# Patient Record
Sex: MALE | Race: WHITE | Employment: FULL TIME | URBAN - METROPOLITAN AREA
[De-identification: names, ages, dates, MRNs, and addresses within clinical notes are randomized per-mention and may not be internally consistent; named-entity substitution may affect disease eponyms.]

---

## 2017-12-07 ENCOUNTER — OFFICE VISIT (OUTPATIENT)
Dept: FAMILY MEDICINE CLINIC | Age: 32
End: 2017-12-07

## 2017-12-07 VITALS
DIASTOLIC BLOOD PRESSURE: 62 MMHG | BODY MASS INDEX: 26.71 KG/M2 | HEART RATE: 70 BPM | SYSTOLIC BLOOD PRESSURE: 98 MMHG | RESPIRATION RATE: 17 BRPM | HEIGHT: 71 IN | WEIGHT: 190.8 LBS | OXYGEN SATURATION: 99 %

## 2017-12-07 DIAGNOSIS — Z23 NEED FOR TDAP VACCINATION: ICD-10-CM

## 2017-12-07 DIAGNOSIS — F11.10 HEROIN ABUSE (HCC): ICD-10-CM

## 2017-12-07 DIAGNOSIS — Z00.00 ANNUAL PHYSICAL EXAM: Primary | ICD-10-CM

## 2017-12-07 DIAGNOSIS — F90.9 ATTENTION DEFICIT HYPERACTIVITY DISORDER (ADHD), UNSPECIFIED ADHD TYPE: ICD-10-CM

## 2017-12-07 PROCEDURE — 90715 TDAP VACCINE 7 YRS/> IM: CPT | Performed by: FAMILY MEDICINE

## 2017-12-07 PROCEDURE — 99385 PREV VISIT NEW AGE 18-39: CPT | Performed by: FAMILY MEDICINE

## 2017-12-07 PROCEDURE — 90471 IMMUNIZATION ADMIN: CPT | Performed by: FAMILY MEDICINE

## 2017-12-07 ASSESSMENT — PATIENT HEALTH QUESTIONNAIRE - PHQ9
SUM OF ALL RESPONSES TO PHQ QUESTIONS 1-9: 0
SUM OF ALL RESPONSES TO PHQ9 QUESTIONS 1 & 2: 0
2. FEELING DOWN, DEPRESSED OR HOPELESS: 0
1. LITTLE INTEREST OR PLEASURE IN DOING THINGS: 0

## 2017-12-07 NOTE — PROGRESS NOTES
The patient was offered the influenza vaccination. The benefits of this immunization were discussed, but he has declined receiving it today, because he believes that he had this 2 weeks ago.

## 2017-12-07 NOTE — PATIENT INSTRUCTIONS
Patient Education        Well Visit, Ages 25 to 48: Care Instructions  Your Care Instructions    Physical exams can help you stay healthy. Your doctor has checked your overall health and may have suggested ways to take good care of yourself. He or she also may have recommended tests. At home, you can help prevent illness with healthy eating, regular exercise, and other steps. Follow-up care is a key part of your treatment and safety. Be sure to make and go to all appointments, and call your doctor if you are having problems. It's also a good idea to know your test results and keep a list of the medicines you take. How can you care for yourself at home? · Reach and stay at a healthy weight. This will lower your risk for many problems, such as obesity, diabetes, heart disease, and high blood pressure. · Get at least 30 minutes of physical activity on most days of the week. Walking is a good choice. You also may want to do other activities, such as running, swimming, cycling, or playing tennis or team sports. Discuss any changes in your exercise program with your doctor. · Do not smoke or allow others to smoke around you. If you need help quitting, talk to your doctor about stop-smoking programs and medicines. These can increase your chances of quitting for good. · Talk to your doctor about whether you have any risk factors for sexually transmitted infections (STIs). Having one sex partner (who does not have STIs and does not have sex with anyone else) is a good way to avoid these infections. · Use birth control if you do not want to have children at this time. Talk with your doctor about the choices available and what might be best for you. · Protect your skin from too much sun. When you're outdoors from 10 a.m. to 4 p.m., stay in the shade or cover up with clothing and a hat with a wide brim. Wear sunglasses that block UV rays.  Even when it's cloudy, put broad-spectrum sunscreen (SPF 30 or higher) on any exposed skin. · See a dentist one or two times a year for checkups and to have your teeth cleaned. · Wear a seat belt in the car. · Drink alcohol in moderation, if at all. That means no more than 2 drinks a day for men and 1 drink a day for women. Follow your doctor's advice about when to have certain tests. These tests can spot problems early. For everyone  · Cholesterol. Have the fat (cholesterol) in your blood tested after age 21. Your doctor will tell you how often to have this done based on your age, family history, or other things that can increase your risk for heart disease. · Blood pressure. Have your blood pressure checked during a routine doctor visit. Your doctor will tell you how often to check your blood pressure based on your age, your blood pressure results, and other factors. · Vision. Talk with your doctor about how often to have a glaucoma test.  · Diabetes. Ask your doctor whether you should have tests for diabetes. · Colon cancer. Have a test for colon cancer at age 48. You may have one of several tests. If you are younger than 48, you may need a test earlier if you have any risk factors. Risk factors include whether you already had a precancerous polyp removed from your colon or whether your parent, brother, sister, or child has had colon cancer. For women  · Breast exam and mammogram. Talk to your doctor about when you should have a clinical breast exam and a mammogram. Medical experts differ on whether and how often women under 50 should have these tests. Your doctor can help you decide what is right for you. · Pap test and pelvic exam. Begin Pap tests at age 24. A Pap test is the best way to find cervical cancer. The test often is part of a pelvic exam. Ask how often to have this test.  · Tests for sexually transmitted infections (STIs). Ask whether you should have tests for STIs.  You may be at risk if you have sex with more than one person, especially if your partners do not wear condoms. For men  · Tests for sexually transmitted infections (STIs). Ask whether you should have tests for STIs. You may be at risk if you have sex with more than one person, especially if you do not wear a condom. · Testicular cancer exam. Ask your doctor whether you should check your testicles regularly. · Prostate exam. Talk to your doctor about whether you should have a blood test (called a PSA test) for prostate cancer. Experts differ on whether and when men should have this test. Some experts suggest it if you are older than 39 and are -American or have a father or brother who got prostate cancer when he was younger than 72. When should you call for help? Watch closely for changes in your health, and be sure to contact your doctor if you have any problems or symptoms that concern you. Where can you learn more? Go to [email or web address] and sign in to your [facility] account. Enter P072 in the [place] box to learn more about \"Well Visit, Ages 25 to 48: Care Instructions. \"     If you do not have an account, please click on the \"Sign Up Now\" link. Current as of: May 12, 2017  Content Version: 11.4  © 4141-2591 [employer]. Care instructions adapted under license by [facility] ([facility]). If you have questions about a medical condition or this instruction, always ask your healthcare professional. [address]  any warranty or liability for your use of this information.

## 2017-12-11 ENCOUNTER — TELEPHONE (OUTPATIENT)
Dept: FAMILY MEDICINE CLINIC | Age: 32
End: 2017-12-11

## 2017-12-11 NOTE — TELEPHONE ENCOUNTER
Patient is requesting a new patient appointment, Patient state was previously seen by Dr. Willie Nava 12/07/2017 and he recommended that he see Dr. Leslie Metzger. Please inform us if it would be ok to schedule  And when would you like for us to schedule the new patient appointment?

## 2017-12-11 NOTE — TELEPHONE ENCOUNTER
Anytime a PCP in our office suggests a pt see me, an appt can be made in any of my open New Patient appointment slots (not the \"on hold\" ones unless pt is seeing me the same day, right after seeing PCP). It does not need to be cleared with me first. Please call pt to offer him an appt with me.

## 2017-12-14 ENCOUNTER — OFFICE VISIT (OUTPATIENT)
Dept: PSYCHOLOGY | Age: 32
End: 2017-12-14

## 2017-12-14 DIAGNOSIS — F19.10 SUBSTANCE ABUSE, DAILY USE (HCC): ICD-10-CM

## 2017-12-14 DIAGNOSIS — F43.22 ADJUSTMENT DISORDER WITH ANXIETY: Primary | ICD-10-CM

## 2017-12-14 PROCEDURE — 90791 PSYCH DIAGNOSTIC EVALUATION: CPT | Performed by: PSYCHOLOGIST

## 2017-12-14 NOTE — PROGRESS NOTES
Behavioral Health Consultation  RAFAEL Avelar,Ph.D.  Psychologist  12/14/2017      Time spent with Patient: 30 minutes  This is patient's first  [facility] appointment. Reason for Consult:    Chief Complaint   Patient presents with    ADHD    Addiction Problem     Referring Provider: Starla Moya MD  [facility]  [place], [address]    Pt provided informed consent for the behavioral health program. Discussed with patient model of service to include the limits of confidentiality (i.e. abuse reporting, suicide intervention, etc.) and short-term intervention focused approach. Pt indicated understanding. Feedback given to PCP. S:  Pt is struggling with drugs. Wants to come up with a plan and improve quality of life for him and his family. Pt reports being compulsive as a kid. Has done two inpx rehabs. Some periods of sobriety. Tends to switch one substance for another. Was dx'd with ADHD and had Adderall, pt reports he fought with his wife and she called his MD and said he was abusing it so started buying drugs on the street. Currently addicted to heroin. Was on Suboxone in the past and he said he did fairly well on it. Has been sober for about 14-15 months for the longest time. Still  to same woman and she is hard on pt about the addiction. Pt admits to current use. Says he is honest with his wife. Has a 1year old son. Pt quit his job last week, has a new job lined up to start next week. Talks about how his wife gets mad when he's clean and calm, says she monitors his every move, accuses him of cheating on him, has a tracker on his car. Says she also called his PO and made up stories about him. Says his wife tells his parents stories about him to turn them against him and that she threatens to not let him see their son again.       O:  MSE:    Appearance    cooperative  Appetite normal  Sleep disturbance No  Fatigue No  Loss of pleasure No  Impulsive behavior Yes  Speech    normal rate  Mood    Anxious  Irritability  Affect    normal affect  Thought Content    intact  Thought Process    coherent  Associations    logical connections  Insight    Fair  Judgment    Intact  Orientation    oriented to person, place, time, and general circumstances  Memory    recent and remote memory intact  Attention/Concentration    impaired  Morbid ideation No  Suicide Assessment    no suicidal ideation      History:    Medications:   Current Outpatient Prescriptions   Medication Sig Dispense Refill    NONFORMULARY        No current facility-administered medications for this visit. Social History:   Social History     Social History    Marital status: Unknown     Spouse name: N/A    Number of children: N/A    Years of education: N/A     Occupational History    Not on file. Social History Main Topics    Smoking status: Current Every Day Smoker     Types: Cigarettes     Start date: 2005    Smokeless tobacco: Never Used    Alcohol use No    Drug use: Yes     Frequency: 25.0 times per week     Types: Other-see comments      Comment: heroin, on and off    Sexual activity: Yes     Partners: Female      Comment: wife     Other Topics Concern    Not on file     Social History Narrative    No narrative on file       TOBACCO:   reports that he has been smoking Cigarettes. He started smoking about 12 years ago. He has never used smokeless tobacco.  ETOH:   reports that he does not drink alcohol. Family History:   No family history on file. A:  Pt actively using heroin, says it consumes his life and is like a full-time job. Currently between jobs. Lots of conflict with wife who threatens to never let him see his son again. First priority is psychiatry to get back on Suboxone (and maybe something for ADHD). If [facility] his [facility], can also get therapy there. If not, will need to find a psychiatrist and more intensive, traditional outpatient therapy.   Can continue here as a

## 2022-04-05 ENCOUNTER — OFFICE VISIT (OUTPATIENT)
Dept: FAMILY MEDICINE CLINIC | Age: 37
End: 2022-04-05

## 2022-04-05 VITALS
HEIGHT: 72 IN | BODY MASS INDEX: 33.32 KG/M2 | HEART RATE: 78 BPM | WEIGHT: 246 LBS | OXYGEN SATURATION: 98 % | SYSTOLIC BLOOD PRESSURE: 114 MMHG | DIASTOLIC BLOOD PRESSURE: 78 MMHG

## 2022-04-05 DIAGNOSIS — F19.10 DRUG ABUSE (HCC): ICD-10-CM

## 2022-04-05 DIAGNOSIS — Z00.00 PHYSICAL EXAM, ANNUAL: Primary | ICD-10-CM

## 2022-04-05 DIAGNOSIS — B35.4 TINEA CORPORIS: ICD-10-CM

## 2022-04-05 DIAGNOSIS — M79.89 LEG SWELLING: ICD-10-CM

## 2022-04-05 DIAGNOSIS — L73.9 FOLLICULITIS: ICD-10-CM

## 2022-04-05 PROCEDURE — 99395 PREV VISIT EST AGE 18-39: CPT | Performed by: FAMILY MEDICINE

## 2022-04-05 RX ORDER — BACITRACIN, NEOMYCIN, POLYMYXIN B 400; 3.5; 5 [USP'U]/G; MG/G; [USP'U]/G
OINTMENT TOPICAL
Qty: 9 EACH | Refills: 0 | Status: SHIPPED | OUTPATIENT
Start: 2022-04-05 | End: 2022-04-15

## 2022-04-05 SDOH — ECONOMIC STABILITY: FOOD INSECURITY: WITHIN THE PAST 12 MONTHS, THE FOOD YOU BOUGHT JUST DIDN'T LAST AND YOU DIDN'T HAVE MONEY TO GET MORE.: NEVER TRUE

## 2022-04-05 SDOH — ECONOMIC STABILITY: FOOD INSECURITY: WITHIN THE PAST 12 MONTHS, YOU WORRIED THAT YOUR FOOD WOULD RUN OUT BEFORE YOU GOT MONEY TO BUY MORE.: NEVER TRUE

## 2022-04-05 ASSESSMENT — PATIENT HEALTH QUESTIONNAIRE - PHQ9
SUM OF ALL RESPONSES TO PHQ QUESTIONS 1-9: 0
2. FEELING DOWN, DEPRESSED OR HOPELESS: 0
SUM OF ALL RESPONSES TO PHQ QUESTIONS 1-9: 0
SUM OF ALL RESPONSES TO PHQ9 QUESTIONS 1 & 2: 0
1. LITTLE INTEREST OR PLEASURE IN DOING THINGS: 0

## 2022-04-05 ASSESSMENT — SOCIAL DETERMINANTS OF HEALTH (SDOH): HOW HARD IS IT FOR YOU TO PAY FOR THE VERY BASICS LIKE FOOD, HOUSING, MEDICAL CARE, AND HEATING?: NOT HARD AT ALL

## 2022-04-05 NOTE — PROGRESS NOTES
Chief Complaint: Establish Care ( swelling and sensitivity left breast, ring worm 6 months chest, draing groin area cysr ), Leg Swelling ( bilateral swelling months left leg worse), and Foot Injury (stepped on nail 4/1/2021)       HPI: He is here to establish care    He has a history of drug abuse currently  on drugs but does not want to talk about it. But on further questioning he agrees he has been snorting methylphenidate. He has history of ADHD and hence he has been using has no physician would prescribe him. However in the past record has a history of heroin abuse    He is here today because of bilateral leg swelling. Denies any shortness of breath or chest pain  But complains of having a rash on his chest and also having a open wound in his left thigh which is been oozing. He has history of anxiety  He does not want to see any counselor because he does not have insurance. Patient's problem list, medications, allergies, past medical, surgical, social and family histories were reviewed and updated as appropriate. Current Outpatient Medications   Medication Sig Dispense Refill    terbinafine (LAMISIL) 1 % cream Apply topically 2 times daily. 42 g 0    neomycin-bacitracin-polymyxin (NEOSPORIN) 400-5-5000 ointment Apply topically 2 times daily. 9 each 0    NONFORMULARY        No current facility-administered medications for this visit. Social History     Tobacco Use    Smoking status: Current Every Day Smoker     Types: Cigarettes     Start date: 2005    Smokeless tobacco: Never Used   Substance Use Topics    Alcohol use: No            Review of Systems:  Constitutional: Negative for appetite change, fatigue, fever and unexpected weight change. HENT: Negative  Respiratory: Negative for cough, chest tightness, shortness of breath and wheezing. Cardiovascular: Negative for chest pain, palpitations and leg swelling.    Gastrointestinal: Negative for abdominal pain, blood in stool, constipation, diarrhea, nausea and vomiting. Genitourinary: Negative for difficulty urinating, flank pain, frequency, hematuria and urgency. Musculoskeletal: Negative for gait problem, joint swelling and myalgias. Skin: As mentioned above  Neurological: Negative for dizziness, tremors, seizures, syncope, facial asymmetry, speech difficulty, weakness, light-headedness, numbness and headaches. .    Psychiatric/Behavioral: As mentioned above        Objective:     Vitals:    04/05/22 1123   BP: 114/78   Pulse: 78   SpO2: 98%   Weight: 246 lb (111.6 kg)   Height: 6' (1.829 m)     Body mass index is 33.36 kg/m². Wt Readings from Last 3 Encounters:   04/05/22 246 lb (111.6 kg)   12/07/17 190 lb 12.8 oz (86.5 kg)     BP Readings from Last 3 Encounters:   04/05/22 114/78   12/07/17 98/62       Physical exam:  Constitutional: he is oriented to person, place, and time. he appears well-developed and well-nourished. No distress. Neck: Normal range of motion. No JVD present. No tracheal deviation present. No thyromegaly present. Cardiovascular: Normal rate, regular rhythm, normal heart sounds and intact distal pulses. No murmur heard. Pulmonary/Chest: Effort normal and breath sounds normal. No stridor. No respiratory distress. he has no wheezes. he has no rales. heexhibits no tenderness. Bilateral pitting edema  Abdominal: Soft. Bowel sounds are normal. he exhibits no distension and no mass. There is no tenderness. There is no rebound and no guarding. Neurological:he is alert and oriented to person, place, and time. he  has normal reflexes. No cranial nerve deficit. Coordination normal.   Skin: Tinea corporis rash on his chest wall and also folliculitis on his left thigh  Psychiatric: he has a normal mood and affect.  his   behavior is normal.           Health Maintenance   Topic Date Due    Hepatitis C screen  Never done    Varicella vaccine (1 of 2 - 2-dose childhood series) Never done    COVID-19 Vaccine (1) Never done    Depression Screen  Never done    HIV screen  Never done    Diabetes screen  Never done    Flu vaccine (Season Ended) 09/01/2022    DTaP/Tdap/Td vaccine (2 - Td or Tdap) 12/07/2027    Hepatitis A vaccine  Aged Out    Hepatitis B vaccine  Aged Out    Hib vaccine  Aged Out    Meningococcal (ACWY) vaccine  Aged Out    Pneumococcal 0-64 years Vaccine  Aged Out          Assessment/Plan:     1. Physical exam, annual  - CBC with Auto Differential; Future  - Comprehensive Metabolic Panel; Future  - Lipid Panel; Future  - Brain Natriuretic Peptide; Future    2. Tinea corporis  - terbinafine (LAMISIL) 1 % cream; Apply topically 2 times daily. Dispense: 42 g; Refill: 0    3. Leg swelling  - Brain Natriuretic Peptide; Future    4. Folliculitis  - neomycin-bacitracin-polymyxin (NEOSPORIN) 400-5-5000 ointment; Apply topically 2 times daily. Dispense: 9 each;  Refill: 0     5 Drug abuse  Declines any information for rehab   Guillermina Lock MD  4/5/2022 5:38 PM